# Patient Record
Sex: MALE | URBAN - METROPOLITAN AREA
[De-identification: names, ages, dates, MRNs, and addresses within clinical notes are randomized per-mention and may not be internally consistent; named-entity substitution may affect disease eponyms.]

---
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
## 2021-04-05 ENCOUNTER — TRANSFERRED RECORDS (OUTPATIENT)
Dept: HEALTH INFORMATION MANAGEMENT | Facility: CLINIC | Age: 21
End: 2021-04-05

## 2021-10-14 ENCOUNTER — TRANSFERRED RECORDS (OUTPATIENT)
Dept: HEALTH INFORMATION MANAGEMENT | Facility: CLINIC | Age: 21
End: 2021-10-14

## 2021-11-18 ENCOUNTER — TRANSFERRED RECORDS (OUTPATIENT)
Dept: HEALTH INFORMATION MANAGEMENT | Facility: CLINIC | Age: 21
End: 2021-11-18

## 2021-11-19 ENCOUNTER — TRANSFERRED RECORDS (OUTPATIENT)
Dept: HEALTH INFORMATION MANAGEMENT | Facility: CLINIC | Age: 21
End: 2021-11-19

## 2022-03-08 ENCOUNTER — TRANSFERRED RECORDS (OUTPATIENT)
Dept: HEALTH INFORMATION MANAGEMENT | Facility: CLINIC | Age: 22
End: 2022-03-08

## 2022-04-28 ENCOUNTER — TRANSFERRED RECORDS (OUTPATIENT)
Dept: HEALTH INFORMATION MANAGEMENT | Facility: CLINIC | Age: 22
End: 2022-04-28

## 2022-07-10 ENCOUNTER — TRANSFERRED RECORDS (OUTPATIENT)
Dept: HEALTH INFORMATION MANAGEMENT | Facility: CLINIC | Age: 22
End: 2022-07-10

## 2022-07-11 ENCOUNTER — TRANSFERRED RECORDS (OUTPATIENT)
Dept: HEALTH INFORMATION MANAGEMENT | Facility: CLINIC | Age: 22
End: 2022-07-11

## 2022-07-13 ENCOUNTER — TRANSFERRED RECORDS (OUTPATIENT)
Dept: HEALTH INFORMATION MANAGEMENT | Facility: CLINIC | Age: 22
End: 2022-07-13

## 2023-03-28 ENCOUNTER — TRANSFERRED RECORDS (OUTPATIENT)
Dept: HEALTH INFORMATION MANAGEMENT | Facility: CLINIC | Age: 23
End: 2023-03-28

## 2023-03-28 ENCOUNTER — LAB REQUISITION (OUTPATIENT)
Dept: LAB | Facility: CLINIC | Age: 23
End: 2023-03-28

## 2023-03-28 PROCEDURE — 83150 ASSAY OF HOMOVANILLIC ACID: CPT

## 2023-03-29 LAB
HVA/CREAT UR-SRTO: 4.5 MG/G CR (ref 0–8.9)
VMA/CREAT UR: 3.9 MG/G CR (ref 0–8.1)

## 2023-04-24 ENCOUNTER — TRANSFERRED RECORDS (OUTPATIENT)
Dept: HEALTH INFORMATION MANAGEMENT | Facility: CLINIC | Age: 23
End: 2023-04-24

## 2023-06-27 ENCOUNTER — TRANSFERRED RECORDS (OUTPATIENT)
Dept: HEALTH INFORMATION MANAGEMENT | Facility: CLINIC | Age: 23
End: 2023-06-27

## 2023-08-07 ENCOUNTER — TRANSFERRED RECORDS (OUTPATIENT)
Dept: HEALTH INFORMATION MANAGEMENT | Facility: CLINIC | Age: 23
End: 2023-08-07

## 2023-08-08 ENCOUNTER — TRANSFERRED RECORDS (OUTPATIENT)
Dept: HEALTH INFORMATION MANAGEMENT | Facility: CLINIC | Age: 23
End: 2023-08-08

## 2023-08-09 ENCOUNTER — TRANSFERRED RECORDS (OUTPATIENT)
Dept: HEALTH INFORMATION MANAGEMENT | Facility: CLINIC | Age: 23
End: 2023-08-09

## 2023-08-10 ENCOUNTER — TRANSFERRED RECORDS (OUTPATIENT)
Dept: HEALTH INFORMATION MANAGEMENT | Facility: CLINIC | Age: 23
End: 2023-08-10

## 2023-08-11 ENCOUNTER — TRANSFERRED RECORDS (OUTPATIENT)
Dept: HEALTH INFORMATION MANAGEMENT | Facility: CLINIC | Age: 23
End: 2023-08-11

## 2023-12-15 ENCOUNTER — TRANSFERRED RECORDS (OUTPATIENT)
Dept: HEALTH INFORMATION MANAGEMENT | Facility: CLINIC | Age: 23
End: 2023-12-15

## 2023-12-16 ENCOUNTER — TRANSFERRED RECORDS (OUTPATIENT)
Dept: HEALTH INFORMATION MANAGEMENT | Facility: CLINIC | Age: 23
End: 2023-12-16

## 2024-04-04 ENCOUNTER — MEDICAL CORRESPONDENCE (OUTPATIENT)
Dept: HEALTH INFORMATION MANAGEMENT | Facility: CLINIC | Age: 24
End: 2024-04-04
Payer: COMMERCIAL

## 2024-04-05 ENCOUNTER — TRANSCRIBE ORDERS (OUTPATIENT)
Dept: OTHER | Age: 24
End: 2024-04-05

## 2024-04-05 DIAGNOSIS — E31.23: Primary | ICD-10-CM

## 2024-04-08 ENCOUNTER — TELEPHONE (OUTPATIENT)
Dept: ENDOCRINOLOGY | Facility: CLINIC | Age: 24
End: 2024-04-08
Payer: COMMERCIAL

## 2024-04-10 ENCOUNTER — TELEPHONE (OUTPATIENT)
Dept: ENDOCRINOLOGY | Facility: CLINIC | Age: 24
End: 2024-04-10
Payer: COMMERCIAL

## 2024-04-10 ENCOUNTER — TELEPHONE (OUTPATIENT)
Dept: ENDOCRINOLOGY | Facility: CLINIC | Age: 24
End: 2024-04-10

## 2024-04-10 NOTE — TELEPHONE ENCOUNTER
Left Voicemail (1st Attempt) for the patient to call back and schedule the following:    Appointment type: new endocrine   Provider: Dr. Lara   Return date:  4/10 in person at 3pm or first available   Specialty phone number: [phone]  Additional appointment(s) needed:   Additonal Notes:     Jorge Parks on 4/10/2024 at 10:37 AM

## 2024-04-11 ENCOUNTER — TRANSFERRED RECORDS (OUTPATIENT)
Dept: HEALTH INFORMATION MANAGEMENT | Facility: CLINIC | Age: 24
End: 2024-04-11
Payer: COMMERCIAL

## 2024-04-12 NOTE — TELEPHONE ENCOUNTER
DX, Referring NOTES: Multiple endocrine neoplasia type 2B     For Cancer Patients: Need the original operative and surgical pathology reports and all imaging reports/images related to the disease (includes all thyroid US, nuclear thyroid and total body scans, PET scans, chest CT reports since prior to the diagnosis ).   APPT DATE: 4/29/2024   NOTES (FOR ALL VISITS) STATUS DETAILS   OFFICE NOTES from referring provider [facility]:  3/28/23 - ENDO OV with Dr. Mejia   OFFICE NOTES from other specialist Received [facility]:  8/22/23 - END OOV with Dr. Alvarez  6/27/23 - SURG ONC OV with Dr. Fernandez  3/29/22 - SURG ONC OV with Dr. Plaza  3/8/22 - ENDO OV with Dr. Reynolds  10/3/19 - ENDO OV with Dr. Foster    [facility]:  7/25/17 - ENT OV with Dr. Corado  7/20/17 - HEM ONC OV with Dr. Correa   ED NOTES Received [facility]:  7/10/22 - Admission with Dr. Oconnor   OPERATIVE REPORT  (thyroid, pituitary, adrenal, parathyroid)  (All op notes related to diagnoses) Received [facility]:  7/11/22 - OP Note for PARTIAL LEFT ADRENALECTOMY with Dr. Meredith    [facility]:  7/7/17 - OP Note for RADICAL TOTAL THYROIDECTOMY, CENTRA NECK DISSECTION, REMOVAL OF MEDIASTINAL LYMPH NODES, PARTIAL THYMUS REMOVAL, PARATHYROID AUTOTRANSPLANTATION with Dr. Corado   MEDICATION LIST Received    IMAGING      MRI (BRAIN) Received [facility]:  10/24/17 - MRI Orbit/Face   XR (Chest) Received [facility]:  11/19/21 - XR Video Swallow   CT (HEAD/NECK/CHEST/ABDOMEN) Received [facility]:  8/10/23 - PET/CT  8/9/23 - PET/CT  8/9/23 - CT Chest/Abd/Pelvis  8/9/23 - CT Neck  8/8/23 - PET/CT  3/9/22 - PET/CT  3/8/22 = PET/CT  3/7/22 - PET/CT    [facility]:  7/19/17 - PET/CT - In PACs   NUCLEAR Received [facility]:  8/11/23 - NM PET    [facility]:  6/29/17 - NM PET/CT   ULTRASOUND (HEAD/NECK)  * Include FNAs Received [facility]:  11/18/21 - US Neck  3/4/21 - US Thyroid  9/30/19 - US Neck  10/16/18 - US Neck  2/13/18 - US Neck    [facility]:  10/24/17 - US Head/Neck  6/28/17 - US Head/Neck  6/28/17 - US  Thyroid  17 - US Thyroid   LABS     DIABETES: HBGA1C, CREATININE, FASTING LIPIDS, MICROALBUMIN URINE, POTASSIUM, TSH, T4    THYROID: TSH, T4, CBC, THYRODLONULIN, TOTAL T3, FREE T4, CALCITONIN, CEA [facility] [place]:  3/28/23 - Albumin  3/28/23 - Creatinine  3/28/23 - Glucose  3/28/23 - Thyroglobulin  3/28/23 - TSH,T4  22 - HBGA1C  10/14/21 - CEA   PATHOLOGY REPORTS WITH CASE NUMBER  *Surgical path reports for endocrine organs (ovaries, testes, pancreas, etc) Received   [facility]:  17 - Thyroid (Case: [id number])     Records Requested  24    Facility  [facility]  Fax: [phone]  E-mail: [email or web address]  Fax: [phone]  Phone: [phone]   Outcome * 24 1:55 PM Faxed urgent request to [facility] for records to be faxed to the clinic. - Loyda    * 24 11:19 AM Records received from [facility] and sent to HIM to be scanned into the chart. Faxed urgent request to [facility] for records to be faxed to the clinic. - Loyda    * 24 8 AM E-mailed another urgent request to the patient with DANDRE form attached to be able to get the records from [facility]. - Loyda    * 24 12:05 PM DANDRE received from the patient and urgent request sent to [facility] for images and records to be sent to the clinic. - Loyda    * 24 10:10 AM Imaging disc received from [facility] and sent to [facility] to be uploaded into PACs. - Loyda    * 24 10:29 AM Record and imaging disc received from [facility] and sent to be scanned into the chart and uploaded into PACs.  Images received from [facility] and attached to the patient in PACs.    * 24 11:15 AM Records received from [facility] and sent to HIM to be scanned into the chart. - Loyda    Trackin

## 2024-04-12 NOTE — TELEPHONE ENCOUNTER
Left Voicemail (2nd Attempt) for the patient to call back and schedule the following:    Appointment type: new endocrine   Provider: Dr. Lara or Alejandrina   Return date: Next avail New or New XIOMARA within 1 month  Specialty phone number: [phone]  Additional appointment(s) needed: NA   Additonal Notes: LVM x2, No MyC & letter sent x1    Denise Lara MD  [facility]; Denise Lara MD  To schedulers : please schedule as new thyroid cancer with either Dr Lara or Dr Tinoco within a month. This could be a virtual visit.     *Dr Lara does not have New XIOMARA slots, it would be next avail virtual or in person. There is availability within the month in solutions. Thank you.*    Barbara Dan on 4/12/2024 at 8:58 AM

## 2024-04-23 ENCOUNTER — TRANSFERRED RECORDS (OUTPATIENT)
Dept: HEALTH INFORMATION MANAGEMENT | Facility: CLINIC | Age: 24
End: 2024-04-23

## 2024-04-29 ENCOUNTER — VIRTUAL VISIT (OUTPATIENT)
Dept: ENDOCRINOLOGY | Facility: CLINIC | Age: 24
End: 2024-04-29
Attending: PEDIATRICS
Payer: COMMERCIAL

## 2024-04-29 ENCOUNTER — TRANSFERRED RECORDS (OUTPATIENT)
Dept: HEALTH INFORMATION MANAGEMENT | Facility: CLINIC | Age: 24
End: 2024-04-29

## 2024-04-29 ENCOUNTER — PRE VISIT (OUTPATIENT)
Dept: ENDOCRINOLOGY | Facility: CLINIC | Age: 24
End: 2024-04-29

## 2024-04-29 DIAGNOSIS — E89.0 POSTSURGICAL HYPOTHYROIDISM: ICD-10-CM

## 2024-04-29 DIAGNOSIS — E83.51 HYPOCALCEMIA: ICD-10-CM

## 2024-04-29 DIAGNOSIS — E31.23: ICD-10-CM

## 2024-04-29 DIAGNOSIS — Z86.018 HISTORY OF PHEOCHROMOCYTOMA: ICD-10-CM

## 2024-04-29 DIAGNOSIS — E89.6 HISTORY OF PARTIAL ADRENALECTOMY (H): ICD-10-CM

## 2024-04-29 DIAGNOSIS — C73 MEDULLARY THYROID CARCINOMA (H): Primary | ICD-10-CM

## 2024-04-29 PROCEDURE — 99205 OFFICE O/P NEW HI 60 MIN: CPT | Mod: 95

## 2024-04-29 PROCEDURE — 99417 PROLNG OP E/M EACH 15 MIN: CPT | Mod: 95

## 2024-04-29 RX ORDER — AMLODIPINE BESYLATE 10 MG/1
10 TABLET ORAL DAILY
COMMUNITY
Start: 2024-04-29

## 2024-04-29 RX ORDER — LEVOTHYROXINE SODIUM 125 UG/1
125 TABLET ORAL DAILY
COMMUNITY
Start: 2024-04-29 | End: 2024-09-16 | Stop reason: DRUGHIGH

## 2024-04-29 ASSESSMENT — PAIN SCALES - GENERAL: PAINLEVEL: NO PAIN (0)

## 2024-04-29 NOTE — PROGRESS NOTES
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  Treat to normal target TSH. Unstable.  Labs following appt show he is biochemically hypothyroid .    Addendum  5/15/24 TSh 46.95, free T4 1.29, Ca 8,2 PTH 22, phos 3.7, CEA 8.4, calcitonin 70.6, metanephrine 0.42 nM, normetanephrine 0.47 nM-- admitted missed doses LT4   9/13/24 TSH 20.29, free T4 1.67, Ca 8.6, phos 3.4,   5/15/24 neck US compared with 11/18/21 [facility]   Right level 4 # 1 0.7 x 0.3 x 0.8 cm   Right level 5 high 1 1.1 x 0.7 x 1.7 cm   Right level 5 lower # 3 1 x 0.6 x 1.3 cm   Left level 4 # 1 0.4 x 0.4 x 0.6   Left level 5 high # 1 1.4 x 0.4 x 1.2   Left level 6# 1 0.7 x 0.5 x 0.8 abnormal  was  ?  0.4 x 0.6   Left level 7 # 1 0.7 x 0.4 x  0.8  cm     Medullary thyroid carcinoma bilateral, multifocal, grossly invading ETE; + 43/76 LN with LYNNETTE  I don't have the original [facility] path report, only the re-read from [facility]  pT4a, pN1b, cM0, stage BETHANY  Labs : calcitonin, cEA   Neck US at [facility]     History of pheochromocytoma and partial adrenal surgery. .   HTN is being treated with amlodipine  He is at risk for pheo in remaining adrenal   Labs: metanephrine, renin, Na, K     History of partial left adrenalectomy -     Due to the COVID 19 pandemic this visit was a video visit in order to help prevent spread of infection in this patient and the general population. The patient gave verbal consent for the visit today. I have independently reviewed and interpreted labs, imaging as indicated.     Distant Location (provider location):  Off-site  Mode of Communication:  Video Conference via [employer]  Chart review/prep time 1    Visit Start time  1703   Visit Stop time 1736   _180 _ minutes spent on the date of the encounter doing chart  review, history and exam, documentation and further activities as noted above.      Denise Lara MD    Chief complaint:  Trever is a 23 year old male seen in consultation at the request of Dr Bi Mejia ([facility] endocrinology )  for MEN  I have reviewed Care Everywhere including [facility], [facility], lab reports, imaging reports and provider notes as indicated. Care everywhere is not open for [facility] or [facility].   I have also reviewed [facility] records scanned to the media tab, most of which were uploaded AFTER I have reviewed the record to prepare for this appt, so most of the review had to follow the appt     HISTORY OF PRESENT ILLNESS    Trever recalls being seen by periodontist for gum hyperplasia who also noted Mucosal neuromas leading to  ret testing 6/27/27 RET M918T ( I do not have the primary report)  He was soon discovered to have medullary thyroid carcinoma .  Preoperative Calcitonin i 1942 7/7/2017 total thyroidectomy , CND, bilateral level 2-4 neck dissection; right RLN anastomosis ([facility]) -  I do not have operative report MTC MF bilateral, + 42/77 LN with LYNNETTE  . The path report doesn't have primary tumor size measurement-- other notes describe ETE, grossly invading strap muscles, esophageal muscles, cricopharyngeus muscle, plastered and stuck to the tracheal cartilage and invading, encasing the RLN.  Some of the LNs were stuck to the internal jugular, deep spaces of the neck RLN and sympathetic chain.  soft tissues, + margins, + extensive angiolymphatic invasion, + perineural invasion  2 parathyroid glands resected  Post op low calcium and hypertension  7/3/17 MRI nodular thickening left adrenal   7/11/22 laparoscopic partial left adrenalectomy with intraoperative US - ([facility]) --  3 tumor foci removed-- Path: left  pheochromocytoma  x 2  -- I don't have primary path report that fully describes the tumor      Endocrine relevant labs are as follows:  10/16/18  calcitonin 73, CEA 5.3  9/30/19 Calcitonin 96, CEA 5.6  8/5/2020 calcitonin 123, CEA 7.2, metanephrine 0.69 nM, normetanephrine 0.8  3/10/21 calcitonin 99, CEA 7.2, metanephrine 0.5 nM, normetanephrine 0.4  10/14/21 calcitonin 144, CEA 7.9 ng/ml, metanephrine 0.81 nM, normetanephrine 0.63 nM, TSH 0.25, free T4 1.24, PTH 11.4, Ca 9.6, YAIR < 1.8, Tg < 0.1  11/21/21 height 182 cm, weight 50.4 kg, BMI 15.2 kg/m2  3/28/23 calcitonin 130, renin activity 17, metanephrine 0.65 nM, normetanephrine 0.46, TSH 0.04, free T4 1.5, Tg < 0.1, YAIR < 1.8, PTH 12,2 Ca 9.9; .94 mg/g creatinine  8/7/23 [facility]   pg/ml, Epi  <25 pg/ml, DA < 25 pg/ml, cortisol 14.4, CGA 45 (< 0.93 ng/ml), metanephrine 0.47, normetanephrine  0.41    Relevant imaging is as follows: (as read by me as it pertains to endocrine relevant organs)  Selected studieses described   6/22/17 and 6/28/17 thyroid US ([facility]):   Right nodule 1.4 x ? X 2,2   Right sup posterior 0.9  X 0.7 x 1.2 cm   Left whole lobe appears involved  2  X   1.8 x 2.8 / abnormal; shadowing calcificaiton   Left isthmus 1.5 x   7/19/17 FDG PET CT [facility] -on pacs- negative to my eye; thyroid absent  4/28/22 US - not on pacs   4/24/23 MRI  not on pas   12/15/23 CT abdomen - not on pacs: left adrenal atrophic ; subtle thickening right adrenal medial limb (series 2, image 25)  8/8/23 Xray abdomen:   8/8/23 ([facility]) F-18-F-DOPA PET ([facility]) persistent small uptake right upper paratracheal with no progressoin since prior study  8/9/23 F18 F Doamine PET ([facility]) negative  8/9/23 CT  neck , CAP ([facility]): RLL lung nodule 0.2 cm, stable; LLL subpleural nodule 0.2 cm stable; stable left adrenal nodule ; subcm paratracheal nodules lower neck   8/10/23 dotatate PET CT ([facility]) no evidence of SSTR2 positive lesions, including at the left adrenal  8/11/2023 FDG  PET CT  ([facility]) -     He plans to return to [facility] -- he doesn't know when he goes back there.  He needs to send them the 12/23 MRI -   He is in a [facility]  MEN2B study - they do a lot of imaging, other tests.     REVIEW OF SYSTEMS  Weight stable;   Was skinny in -120-125#; now gained to 150s ; height 6'0  Cardiac: BP has been high ; amlodipine helped  respiratory  GI: no diarrhea; negative; has megacolon   No paresthesias   No cramps  Marfanoid body habitus  10 system ROS otherwise as per the HPI or negative    Past Medical History  Past Medical History:   Diagnosis Date    ADHD (attention deficit hyperactivity disorder)     Duane's syndrome, right eye     Hepatic hemangioma     HTN (hypertension)     Hypocalcemia     Lyme disease 2012    Medullary thyroid carcinoma (H) 07/07/2017    Megacolon     Multiple endocrine neoplasia type 2B (MEN2B) (H) 06/27/2017    sporadic mutation RET M918T    Myopathy 2010    Pes cavus     Pheochromocytoma of left adrenal gland 07/11/2022    2 foci    Postsurgical hypothyroidism 07/07/2017    Proximal humerus fracture 04/02/2016    fall from standing height    Renal artery anomaly     trifurcation right; bifurcation left    Scoliosis        Past Surgical History:   Procedure Laterality Date    BIOPSY      HERNIA REPAIR Right 2002    laparoscopic partial adrenalectomy Left 07/11/2022    ORCHIOPEXY Right 2002    ORTHOPEDIC SURGERY      subtalar joint and talonavicular joint fusion with multiple tendon transfers, MPJ capsulotomy, PIPJ capsulotomy, Steindler stripping, and gastrocnemius lengthening by FAS  06/05/2018    THYROIDECTOMY   07/07/2017       Medications  Current Outpatient Medications   Medication Sig Dispense Refill    amLODIPine (NORVASC) 10 MG tablet Take 1 tablet (10 mg) by mouth daily      levothyroxine (SYNTHROID/LEVOTHROID) 125 MCG tablet Take 1 tablet (125 mcg) by mouth daily       Levothryoxine 125 mcg/day x years  Calcium 2-3 pills at the end of the night - (each pill 600)  Amlodipine 10 mg/fday    No longer on calcitriol    Allergies  No Known Allergies    Family History  Family History   Problem Relation Age of  "Onset    Hyperlipidemia Maternal Grandmother     Heart Disease Maternal Grandfather     Multiple Endocrine Neoplasia Type 2 No family hx of     Nephrolithiasis No family hx of     Adrenal Disorder No family hx of     Thyroid Cancer No family hx of      Social History  Social History     Tobacco Use    Smoking status: Never     Lives in [place]; student at 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  BP Readings from Last 1 Encounters:   04/02/16 (!) 134/98 (97%, Z = 1.88 /  >99 %, Z >2.33)*     *BP percentiles are based on the 2017 AAP Clinical Practice Guideline for boys      Pulse Readings from Last 1 Encounters:   No data found for Pulse      Resp Readings from Last 1 Encounters:   04/02/16 18      Temp Readings from Last 1 Encounters:   04/02/16 97.6  F (36.4  C)      SpO2 Readings from Last 1 Encounters:   04/02/16 98%      Wt Readings from Last 1 Encounters:   04/02/16 40.6 kg (89 lb 8 oz) (3%, Z= -1.90)*     * Growth percentiles are based on CDC (Boys, 2-20 Years) data.      Ht Readings from Last 1 Encounters:   04/02/16 1.651 m (5' 5\") (30%, Z= -0.52)*     * Growth percentiles are based on CDC (Boys, 2-20 Years) data.     GENERAL: pleasant young man in no distress; full lips; I can see from upper chest up   SKIN: Visible skin clear. No significant rash, abnormal pigmentation or lesions.  EYES: eyes don't perfectly track when looking up ;   No discharge or erythema, or obvious scleral/conjunctival abnormalities.  NECK: visible goiter is not present; no visible neck masses  RESP: No audible wheeze, cough, or  increased work of breathing.    NEURO: Awake, alert, responds appropriately to questions.  Mentation and speech fluent.  PSYCH:affect normal and appearance well-groomed.    DATA     EXAMINATION: US HEAD NECK SOFT TISSUE, 5/15/2024 11:30 AM   COMPARISON: PET/CT 8/11/2023  HISTORY: Medullary thyroid carcinoma " (H); Postsurgical hypothyroidism;  Hypocalcemia; MEN 2B syndrome (H)  TECHNIQUE: Sonographic imaging performed of the neck to evaluate for lymph nodes using grey scale and limited Doppler technique.  FINDINGS:  Lymph nodes are measured bilaterally with measurements given in transverse, AP, and craniocaudal dimensions as follows:  Right:  Level 2: Ovoid lymph node with diminutive fatty hilum and no internal vascularity on color Doppler measuring 0.9 x 0.7 x 1 cm, possibly benign.  Level 3: No suspicious or enlarged lymph node  Level 4: Ovoid lymph node with fatty hilum and no internal vascularity on Doppler measuring 0.7 x 0.3 x 0.8 cm, possibly benign  Level 5:   Lymph node #1: Oval-shaped lymph node with fatty hilum and no internal vascularity on color Doppler measuring 1.1 x 0.7 x 1.7 cm, probably benign.  Lymph node#3: Ovoid lymph node with fatty hilum and mild hilar vascularity on color Doppler measuring 1 x 0.6 x 1.4 cm, possibly benign   Level 6: No suspicious or enlarged lymph node  Level 7: No suspicious or enlarged lymph node   Left:  Level 2: No suspicious or enlarged lymph node  Level 3: No suspicious or enlarged lymph node  Level 4: Ovoid lymph node measuring 0.4 x 0.4 x 0.6 cm with indistinct  fatty hilum and no internal vascularity, indeterminate/possibly benign.  Level 5: Oval-shaped lymph node with fatty hilum and hilar vascularity on color Doppler measuring 1.4 x 0.4 x 1.2 cm, possibly benign  Level 6 thyroid bed: Mildly lobulated hypoechoic echogenicity with indistinct fatty hilum and internal vascularity measuring 0.7 x 0.5 x 0.8 cm, indeterminate  Level7: Oval-shaped lymph node with no internal vascularity on Doppler and questionable echogenic foci, indeterminate, measuring 0.7 x 0.4 x 0.8 cm.                                                         IMPRESSION:  Soft tissue neck ultrasound with lymph node measurements as described above, including indeterminate left level 6 hypoechogenicity.  I  have personally reviewed the examination and initial interpretation and I agree with the findings.  BREE LACEY MD      Latest Reference Range & Units 05/15/24 11:39   Sodium 135 - 145 mmol/L 139   Potassium 3.4 - 5.3 mmol/L 3.4   Calcium 8.6 - 10.0 mg/dL 8.2 (L)   Phosphorus 2.5 - 4.5 mg/dL 3.7   CEA ng/mL 8.4   METANEPHRINES PLASMA FREE  Rpt (IP)   T4 Free 0.90 - 1.70 ng/dL 1.29   TSH 0.30 - 4.20 uIU/mL 46.95 (H)   Parathyroid Hormone Intact 15 - 65 pg/mL 22

## 2024-04-29 NOTE — PATIENT INSTRUCTIONS
Neck Ultrasound  at the [facility] on [facility] ([phone] to schedule)   Labs in the [employer] system   I will send results on [employer].

## 2024-04-29 NOTE — NURSING NOTE
Is the patient currently in the state of [place]? YES    Visit mode:VIDEO    If the visit is dropped, the patient can be reconnected by: VIDEO VISIT: Text to cell phone:   Telephone Information:   Mobile [phone]       Will anyone else be joining the visit? NO  (If patient encounters technical issues they should call [phone]    How would you like to obtain your AVS? MyChart    Are changes needed to the allergy or medication list? Yes Pt taking Calcium supplement, also Levothyroxine .125mcg morning; amplodipne 10 mg daily;     Are refills needed on medications prescribed by this physician? NO    Reason for visit: Consult    Rosy GARCIAF

## 2024-05-08 ENCOUNTER — TELEPHONE (OUTPATIENT)
Dept: ENDOCRINOLOGY | Facility: CLINIC | Age: 24
End: 2024-05-08
Payer: COMMERCIAL

## 2024-05-08 NOTE — TELEPHONE ENCOUNTER
Patient confirmed scheduled appointment:  Date: 10/28/24  Time: 4 pm  Visit type: return thyroid cancer  Provider: Dr. Lara  Location: virtual  Testing/imaging: labs and US on 5/15/24  Additional notes: NA

## 2024-05-15 ENCOUNTER — ANCILLARY PROCEDURE (OUTPATIENT)
Dept: ULTRASOUND IMAGING | Facility: CLINIC | Age: 24
End: 2024-05-15
Payer: COMMERCIAL

## 2024-05-15 ENCOUNTER — LAB (OUTPATIENT)
Dept: LAB | Facility: CLINIC | Age: 24
End: 2024-05-15
Payer: COMMERCIAL

## 2024-05-15 DIAGNOSIS — E89.0 POSTSURGICAL HYPOTHYROIDISM: ICD-10-CM

## 2024-05-15 DIAGNOSIS — C73 MEDULLARY THYROID CARCINOMA (H): ICD-10-CM

## 2024-05-15 DIAGNOSIS — E31.23: ICD-10-CM

## 2024-05-15 DIAGNOSIS — Z86.018 HISTORY OF PHEOCHROMOCYTOMA: ICD-10-CM

## 2024-05-15 DIAGNOSIS — E89.6 HISTORY OF PARTIAL ADRENALECTOMY (H): ICD-10-CM

## 2024-05-15 DIAGNOSIS — E83.51 HYPOCALCEMIA: ICD-10-CM

## 2024-05-15 LAB
CALCIUM SERPL-MCNC: 8.2 MG/DL (ref 8.6–10)
CEA SERPL-MCNC: 8.4 NG/ML
PHOSPHATE SERPL-MCNC: 3.7 MG/DL (ref 2.5–4.5)
POTASSIUM SERPL-SCNC: 3.4 MMOL/L (ref 3.4–5.3)
PTH-INTACT SERPL-MCNC: 22 PG/ML (ref 15–65)
SODIUM SERPL-SCNC: 139 MMOL/L (ref 135–145)
T4 FREE SERPL-MCNC: 1.29 NG/DL (ref 0.9–1.7)
TSH SERPL DL<=0.005 MIU/L-ACNC: 46.95 UIU/ML (ref 0.3–4.2)

## 2024-05-15 PROCEDURE — 82308 ASSAY OF CALCITONIN: CPT | Mod: 90 | Performed by: PATHOLOGY

## 2024-05-15 PROCEDURE — 84244 ASSAY OF RENIN: CPT | Mod: 90 | Performed by: PATHOLOGY

## 2024-05-15 PROCEDURE — 36415 COLL VENOUS BLD VENIPUNCTURE: CPT | Performed by: PATHOLOGY

## 2024-05-15 PROCEDURE — 84439 ASSAY OF FREE THYROXINE: CPT | Performed by: PATHOLOGY

## 2024-05-15 PROCEDURE — 84443 ASSAY THYROID STIM HORMONE: CPT | Performed by: PATHOLOGY

## 2024-05-15 PROCEDURE — 84132 ASSAY OF SERUM POTASSIUM: CPT | Performed by: PATHOLOGY

## 2024-05-15 PROCEDURE — 83835 ASSAY OF METANEPHRINES: CPT | Mod: 90 | Performed by: PATHOLOGY

## 2024-05-15 PROCEDURE — 84100 ASSAY OF PHOSPHORUS: CPT | Performed by: PATHOLOGY

## 2024-05-15 PROCEDURE — 76536 US EXAM OF HEAD AND NECK: CPT | Mod: GC | Performed by: STUDENT IN AN ORGANIZED HEALTH CARE EDUCATION/TRAINING PROGRAM

## 2024-05-15 PROCEDURE — 84295 ASSAY OF SERUM SODIUM: CPT | Performed by: PATHOLOGY

## 2024-05-15 PROCEDURE — 83970 ASSAY OF PARATHORMONE: CPT | Performed by: PATHOLOGY

## 2024-05-15 PROCEDURE — 86316 IMMUNOASSAY TUMOR OTHER: CPT | Mod: 90 | Performed by: PATHOLOGY

## 2024-05-15 PROCEDURE — 99000 SPECIMEN HANDLING OFFICE-LAB: CPT | Performed by: PATHOLOGY

## 2024-05-15 PROCEDURE — 82378 CARCINOEMBRYONIC ANTIGEN: CPT

## 2024-05-15 PROCEDURE — 82310 ASSAY OF CALCIUM: CPT | Performed by: PATHOLOGY

## 2024-05-17 LAB
CALCIT SERPL-MCNC: 70.6 PG/ML
CGA SERPL-MCNC: 42 NG/ML

## 2024-05-18 LAB
ANNOTATION COMMENT IMP: NORMAL
METANEPHS SERPL-SCNC: 0.42 NMOL/L
NORMETANEPHRINE SERPL-SCNC: 0.47 NMOL/L
RENIN PLAS-CCNC: 22 NG/ML/HR

## 2024-09-08 ENCOUNTER — HEALTH MAINTENANCE LETTER (OUTPATIENT)
Age: 24
End: 2024-09-08

## 2024-09-13 ENCOUNTER — LAB (OUTPATIENT)
Dept: LAB | Facility: CLINIC | Age: 24
End: 2024-09-13
Payer: COMMERCIAL

## 2024-09-13 DIAGNOSIS — E89.0 POSTSURGICAL HYPOTHYROIDISM: ICD-10-CM

## 2024-09-13 DIAGNOSIS — E83.51 HYPOCALCEMIA: ICD-10-CM

## 2024-09-13 DIAGNOSIS — C73 MEDULLARY THYROID CARCINOMA (H): ICD-10-CM

## 2024-09-13 LAB
CALCIUM SERPL-MCNC: 8.6 MG/DL (ref 8.8–10.4)
CEA SERPL-MCNC: 8.6 NG/ML
PHOSPHATE SERPL-MCNC: 3.4 MG/DL (ref 2.5–4.5)
T4 FREE SERPL-MCNC: 1.67 NG/DL (ref 0.9–1.7)
TSH SERPL DL<=0.005 MIU/L-ACNC: 20.29 UIU/ML (ref 0.3–4.2)

## 2024-09-13 PROCEDURE — 82310 ASSAY OF CALCIUM: CPT

## 2024-09-13 PROCEDURE — 84439 ASSAY OF FREE THYROXINE: CPT

## 2024-09-13 PROCEDURE — 84100 ASSAY OF PHOSPHORUS: CPT

## 2024-09-13 PROCEDURE — 84443 ASSAY THYROID STIM HORMONE: CPT

## 2024-09-13 PROCEDURE — 82308 ASSAY OF CALCITONIN: CPT

## 2024-09-13 PROCEDURE — 36415 COLL VENOUS BLD VENIPUNCTURE: CPT

## 2024-09-13 PROCEDURE — 82378 CARCINOEMBRYONIC ANTIGEN: CPT

## 2024-09-15 LAB — CALCIT SERPL-MCNC: 106 PG/ML

## 2024-09-16 DIAGNOSIS — E89.0 POSTSURGICAL HYPOTHYROIDISM: ICD-10-CM

## 2024-09-16 DIAGNOSIS — C73 MEDULLARY THYROID CARCINOMA (H): Primary | ICD-10-CM

## 2024-09-16 RX ORDER — LEVOTHYROXINE SODIUM 150 UG/1
150 TABLET ORAL DAILY
Qty: 90 TABLET | Refills: 4 | Status: SHIPPED | OUTPATIENT
Start: 2024-09-16

## 2024-10-04 NOTE — PROGRESS NOTES
10/04/24 11:34 AM  PATIENT LAB/IMAGING STATUS : MyChart sent to patient to complete standing/future orders

## 2024-10-28 ENCOUNTER — VIRTUAL VISIT (OUTPATIENT)
Dept: ENDOCRINOLOGY | Facility: CLINIC | Age: 24
End: 2024-10-28
Payer: COMMERCIAL

## 2024-10-28 DIAGNOSIS — E89.0 POSTSURGICAL HYPOTHYROIDISM: ICD-10-CM

## 2024-10-28 DIAGNOSIS — E31.23: Primary | ICD-10-CM

## 2024-10-28 DIAGNOSIS — E89.6 HISTORY OF PARTIAL ADRENALECTOMY (H): ICD-10-CM

## 2024-10-28 DIAGNOSIS — C73 MEDULLARY THYROID CARCINOMA (H): ICD-10-CM

## 2024-10-28 DIAGNOSIS — Z86.018 HISTORY OF PHEOCHROMOCYTOMA: ICD-10-CM

## 2024-10-28 DIAGNOSIS — R22.1 NECK MASS: ICD-10-CM

## 2024-10-28 PROCEDURE — 99214 OFFICE O/P EST MOD 30 MIN: CPT | Mod: 95

## 2024-10-28 PROCEDURE — G2211 COMPLEX E/M VISIT ADD ON: HCPCS | Mod: 95

## 2024-10-28 RX ORDER — LISINOPRIL 10 MG/1
10 TABLET ORAL DAILY
COMMUNITY
Start: 2024-10-01

## 2024-10-28 NOTE — LETTER
10/28/2024       RE: Trever Carter  [address]  [facility] [id number]     Dear Colleague,    Thank you for referring your patient, Trever Carter, to the [facility] ENDOCRIN[facility] [place] at [facility]. Please see a copy of my visit note below.    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  Treat to normal target TSH. Not at target  Unstable.  Compliance has been an issue.    Labs in November 2024 with dose adjustment    Medullary thyroid carcinoma bilateral, multifocal, grossly invading ETE; + 43/76 LN with LYNNETTE  I don't have the original [facility] path report, only the re-read from [facility]  pT4a, pN1b, cM0, stage BETHANY    Left level 6 mass  as above  Repeat US now-     History of pheochromocytoma and partial adrenal surgery. .   HTN is being treated with lisinopril   He is at risk for pheo in remaining adrenal     History of partial left adrenalectomy -      The Longitudinal plan of care for postsurgical hypothyroidism related to MEN2b/ thyroid cancer/thyroid cancer surveillance/treatment/ was addressed during this visit. Due to added complexity of care, we will continue to support Trever , and the subsequent management of this condition(s) and with the ongoing continuity of care of this condition.    Due to the continued risks of COVID 19 transmission and to improve ease of access this visit was a telephone/video visit. The patient gave verbal consent for the visit today.    I have independently reviewed and interpreted labs, imaging as indicated.     Distant Location (provider location):  Off-site  Mode of  Communication:  Video Conference via [employer]  Chart review/prep time 1  [id number]  Visit Start time  1549   Visit Stop time  1603   39__ minutes spent on the date of the encounter doing chart review, history and exam, documentation and further activities as noted above.      Denise Lara MD    Chief complaint:  HISTORY OF PRESENT ILLNESS    Trever presents for RET M918T MEN 2B, sporadic by history with associated MTC, pheochromocytoma, mucosal neuromas, megacolon, marfanoid body habitus .  I have seen him once before on 4/2024. We have been working on medication compliance and treatment of surgical hypothyroidism since then.  At our last appt he was on LT4 125 mcg/day . He has been on 150 mcg/day since 7/2024. TSH was still high on the 9/13/24 testing .  Neck US obtained 5/15/24 showed left thyroid bed abnormality.   This has not had FNAB.      The MEN related history is as follows:   The periodontist he was seeing  for gum hyperplasia also noted Mucosal neuromas leading to  ret testing 6/27/27 RET M918T ( I do not have the primary report)  He was soon discovered to have medullary thyroid carcinoma .  Preoperative Calcitonin i 1942 7/7/2017 total thyroidectomy , CND, bilateral level 2-4 neck dissection; right RLN anastomosis ([facility]) -  I do not have operative report MTC MF bilateral, + 42/77 LN with LYNNETTE  . The path report doesn't have primary tumor size measurement-- other notes describe ETE, grossly invading strap muscles, esophageal muscles, cricopharyngeus muscle, plastered and stuck to the tracheal cartilage and invading, encasing the RLN.  Some of the LNs were stuck to the internal jugular, deep spaces of the neck RLN and sympathetic chain.  soft tissues, + margins, + extensive angiolymphatic invasion, + perineural invasion  2 parathyroid glands resected  Post op low calcium and hypertension  7/3/17 MRI nodular thickening left adrenal   7/11/22 laparoscopic partial left adrenalectomy with  intraoperative US - ([facility]) --  3 tumor foci removed-- Path: left  pheochromocytoma  x 2  -- I don't have primary path report that fully describes the tumor    He is in an MEN2B study at [facility].     Today he notes he has had a video visit with [facility] since our last visit -     Endocrine relevant labs are as follows:  10/16/18 calcitonin 73, CEA 5.3  9/30/19 Calcitonin 96, CEA 5.6  8/5/2020 calcitonin 123, CEA 7.2, metanephrine 0.69 nM, normetanephrine 0.8  3/10/21 calcitonin 99, CEA 7.2, metanephrine 0.5 nM, normetanephrine 0.4  10/14/21 calcitonin 144, CEA 7.9 ng/ml, metanephrine 0.81 nM, normetanephrine 0.63 nM, TSH 0.25, free T4 1.24, PTH 11.4, Ca 9.6, YAIR < 1.8, Tg < 0.1  11/21/21 height 182 cm, weight 50.4 kg, BMI 15.2 kg/m2  3/28/23 calcitonin 130, renin activity 17, metanephrine 0.65 nM, normetanephrine 0.46, TSH 0.04, free T4 1.5, Tg < 0.1, YAIR < 1.8, PTH 12,2 Ca 9.9; .94 mg/g creatinine  8/7/23 [facility]   pg/ml, Epi  <25 pg/ml, DA < 25 pg/ml, cortisol 14.4, CGA 45 (< 0.93 ng/ml), metanephrine 0.47, normetanephrine  0.41  5/15/24 TSh 46.95, free T4 1.29, Ca 8,2 PTH 22, phos 3.7, CEA 8.4, calcitonin 70.6, metanephrine 0.42 nM, normetanephrine 0.47 nM-- admitted missed doses LT4   7/15/24 increase LT4 to 150 mcg/day  9/13/24 TSH 20.29, free T4 1.67, Ca 8.6, phos 3.4,     Relevant imaging is as follows: (as read by me as it pertains to endocrine relevant organs)  Selected studieses described   6/22/17 and 6/28/17 thyroid US ([facility]):   Right nodule 1.4 x ? X 2,2   Right sup posterior 0.9  X 0.7 x 1.2 cm   Left whole lobe appears involved  2  X   1.8 x 2.8 / abnormal; shadowing calcificaiton   Left isthmus 1.5 x   7/19/17 FDG PET CT [facility] -on pacs- negative to my eye; thyroid absent  4/28/22 US - not on pacs   4/24/23 MRI  not on pas   12/15/23 CT abdomen - not on pacs: left adrenal atrophic ; subtle thickening right adrenal medial limb (series 2, image 25)  8/8/23 Xray abdomen:   8/8/23 ([facility])  F-18-F-DOPA PET ([facility]) persistent small uptake right upper paratracheal with no progressoin since prior study  8/9/23 F18 F Doamine PET ([facility]) negative  8/9/23 CT  neck , CAP ([facility]): RLL lung nodule 0.2 cm, stable; LLL subpleural nodule 0.2 cm stable; stable left adrenal nodule ; subcm paratracheal nodules lower neck   8/10/23 dotatate PET CT ([facility]) no evidence of SSTR2 positive lesions, including at the left adrenal  8/11/2023 FDG  PET CT  ([facility]) -   5/15/24 neck US compared with 11/18/21 [facility]   Right level 4 # 1 0.7 x 0.3 x 0.8 cm   Right level 5 high 1 1.1 x 0.7 x 1.7 cm   Right level 5 lower # 3 1 x 0.6 x 1.3 cm   Left level 4 # 1 0.4 x 0.4 x 0.6   Left level 5 high # 1 1.4 x 0.4 x 1.2   Left level 6# 1 0.7 x 0.5 x 0.8 abnormal  was  ?  0.4 x 0.6   Left level 7 # 1 0.7 x 0.4 x  0.8  cm       REVIEW OF SYSTEMS    A little more energy  No longer has the grogginess   Sleep at night good  Weight gain - now up to 175# ;    Cardiac :had a scare -wasn't on medication / had run out - and the study doctor was retiring/  -- his BP was high without med 140/90s ; but one day it went up to 170/110 with associated headache.  He went to the clinic and BP was 180/130, 200/120.  They got him back on lisinopril.   Appt with nephrologist at [facility] -- he is working with him on the BP med  they increase   Respiratory: negative   GI: sometimes horrible stomach ache with watery BM, last occurred about one week ago; Now BM is still a little runny  EXT: some swelling  No temperature intolerance.     Past Medical History  Past Medical History:   Diagnosis Date     ADHD (attention deficit hyperactivity disorder)      Duane's syndrome, right eye      Hepatic hemangioma      HTN (hypertension)      Hypocalcemia      Lyme disease 2012     Medullary thyroid carcinoma (H) 07/07/2017     Megacolon      Multiple endocrine neoplasia type 2B (MEN2B) (H) 06/27/2017    sporadic mutation RET M918T     Myopathy 2010     Pes cavus       Pheochromocytoma of left adrenal gland 07/11/2022    2 foci     Postsurgical hypothyroidism 07/07/2017     Proximal humerus fracture 04/02/2016    fall from standing height     Renal artery anomaly     trifurcation right; bifurcation left     Scoliosis        Past Surgical History:   Procedure Laterality Date     BIOPSY       HERNIA REPAIR Right 2002     laparoscopic partial adrenalectomy Left 07/11/2022     ORCHIOPEXY Right 2002     ORTHOPEDIC SURGERY       subtalar joint and talonavicular joint fusion with multiple tendon transfers, MPJ capsulotomy, PIPJ capsulotomy, Steindler stripping, and gastrocnemius lengthening by FAS  06/05/2018     THYROIDECTOMY   07/07/2017       Medications  Current Outpatient Medications   Medication Sig Dispense Refill     amLODIPine (NORVASC) 10 MG tablet Take 1 tablet (10 mg) by mouth daily       levothyroxine (SYNTHROID/LEVOTHROID) 150 MCG tablet Take 1 tablet (150 mcg) by mouth daily. 90 tablet 4     He has a pill tray but? 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  BP Readings from Last 1 Encounters:   04/02/16 (!) 134/98 (97%, Z = 1.88 /  >99 %, Z >2.33)*     *BP percentiles are based on the 2017 AAP Clinical Practice Guideline for boys      Pulse Readings from Last 1 Encounters:   No data found for Pulse      Resp Readings from Last 1 Encounters:   04/02/16 18      Temp Readings  "from Last 1 Encounters:   04/02/16 97.6  F (36.4  C)      SpO2 Readings from Last 1 Encounters:   04/02/16 98%      Wt Readings from Last 1 Encounters:   04/02/16 40.6 kg (89 lb 8 oz) (3%, Z= -1.90)*     * Growth percentiles are based on CDC (Boys, 2-20 Years) data.      Ht Readings from Last 1 Encounters:   04/02/16 1.651 m (5' 5\") (30%, Z= -0.52)*     * Growth percentiles are based on CDC (Boys, 2-20 Years) data.     GENERAL: pleasant young man in no distress; full lips; I can see from upper chest up   SKIN: Visible skin clear. No significant rash, abnormal pigmentation or lesions.  EYES: no scleral icterus;   NECK: visible goiter is not present; no visible neck masses  RESP: No audible wheeze, cough, or  increased work of breathing.    NEURO: Awake, alert, responds appropriately to questions.  Mentation and speech fluent.  PSYCH:affect normal and appearance well-groomed.    DATA     EXAMINATION: US HEAD NECK SOFT TISSUE, 5/15/2024 11:30 AM   COMPARISON: PET/CT 8/11/2023  HISTORY: Medullary thyroid carcinoma (H); Postsurgical hypothyroidism;  Hypocalcemia; MEN 2B syndrome (H)  TECHNIQUE: Sonographic imaging performed of the neck to evaluate for lymph nodes using grey scale and limited Doppler technique.  FINDINGS:  Lymph nodes are measured bilaterally with measurements given in transverse, AP, and craniocaudal dimensions as follows:  Right:  Level 2: Ovoid lymph node with diminutive fatty hilum and no internal vascularity on color Doppler measuring 0.9 x 0.7 x 1 cm, possibly benign.  Level 3: No suspicious or enlarged lymph node  Level 4: Ovoid lymph node with fatty hilum and no internal vascularity on Doppler measuring 0.7 x 0.3 x 0.8 cm, possibly benign  Level 5:   Lymph node #1: Oval-shaped lymph node with fatty hilum and no internal vascularity on color Doppler measuring 1.1 x 0.7 x 1.7 cm, probably benign.  Lymph node#3: Ovoid lymph node with fatty hilum and mild hilar vascularity on color Doppler measuring 1 " x 0.6 x 1.4 cm, possibly benign   Level 6: No suspicious or enlarged lymph node  Level 7: No suspicious or enlarged lymph node   Left:  Level 2: No suspicious or enlarged lymph node  Level 3: No suspicious or enlarged lymph node  Level 4: Ovoid lymph node measuring 0.4 x 0.4 x 0.6 cm with indistinct  fatty hilum and no internal vascularity, indeterminate/possibly benign.  Level 5: Oval-shaped lymph node with fatty hilum and hilar vascularity on color Doppler measuring 1.4 x 0.4 x 1.2 cm, possibly benign  Level 6 thyroid bed: Mildly lobulated hypoechoic echogenicity with indistinct fatty hilum and internal vascularity measuring 0.7 x 0.5 x 0.8 cm, indeterminate  Level7: Oval-shaped lymph node with no internal vascularity on Doppler and questionable echogenic foci, indeterminate, measuring 0.7 x 0.4 x 0.8 cm.                                                         IMPRESSION:  Soft tissue neck ultrasound with lymph node measurements as described above, including indeterminate left level 6 hypoechogenicity.  I have personally reviewed the examination and initial interpretation and I agree with the findings.  BREE LACEY MD      Latest Reference Range & Units 05/15/24 11:39 09/13/24 11:10   Sodium 135 - 145 mmol/L 139    Potassium 3.4 - 5.3 mmol/L 3.4    Calcium 8.8 - 10.4 mg/dL 8.2 (L) 8.6 (L)   Phosphorus 2.5 - 4.5 mg/dL 3.7 3.4   Calcitonin 0.0 - 7.5 pg/mL 70.6 (H) 106.0 (H)   CEA ng/mL 8.4 8.6   METANEPHRINES PLASMA FREE  Rpt    Renin Activity ng/mL/hr 22.0    T4 Free 0.90 - 1.70 ng/dL 1.29 1.67   TSH 0.30 - 4.20 uIU/mL 46.95 (H) 20.29 (H)      Latest Reference Range & Units 05/15/24 11:39   Parathyroid Hormone Intact 15 - 65 pg/mL 22      Latest Reference Range & Units 05/15/24 11:39   Chromogranin A 0 - 187 ng/mL 42         Again, thank you for allowing me to participate in the care of your patient.      Sincerely,    Denise Lara MD

## 2024-10-28 NOTE — PROGRESS NOTES
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  Treat to normal target TSH. Not at target  Unstable.  Compliance has been an issue.    Labs in November 2024 with dose adjustment    Medullary thyroid carcinoma bilateral, multifocal, grossly invading ETE; + 43/76 LN with LYNNETTE  I don't have the original [facility] path report, only the re-read from [facility]  pT4a, pN1b, cM0, stage BETHANY    Left level 6 mass  as above  Repeat US now-     History of pheochromocytoma and partial adrenal surgery. .   HTN is being treated with lisinopril   He is at risk for pheo in remaining adrenal     History of partial left adrenalectomy -      The Longitudinal plan of care for postsurgical hypothyroidism related to MEN2b/ thyroid cancer/thyroid cancer surveillance/treatment/ was addressed during this visit. Due to added complexity of care, we will continue to support Trever , and the subsequent management of this condition(s) and with the ongoing continuity of care of this condition.    Due to the continued risks of COVID 19 transmission and to improve ease of access this visit was a telephone/video visit. The patient gave verbal consent for the visit today.    I have independently reviewed and interpreted labs, imaging as indicated.     Distant Location (provider location):  Off-site  Mode of Communication:  Video Conference via Thucy  Chart review/prep time 1  [id number]  Visit Start time  1549   Visit Stop time  1603   39__ minutes spent on the date of the encounter doing chart review, history and exam, documentation and further activities as noted above.      Denise Lara MD    Chief complaint:  HISTORY OF PRESENT ILLNESS    Trever presents for RET M918T MEN 2B, sporadic by history with associated MTC,  pheochromocytoma, mucosal neuromas, megacolon, marfanoid body habitus .  I have seen him once before on 4/2024. We have been working on medication compliance and treatment of surgical hypothyroidism since then.  At our last appt he was on LT4 125 mcg/day . He has been on 150 mcg/day since 7/2024. TSH was still high on the 9/13/24 testing .  Neck US obtained 5/15/24 showed left thyroid bed abnormality.   This has not had FNAB.      The MEN related history is as follows:   The periodontist he was seeing  for gum hyperplasia also noted Mucosal neuromas leading to  ret testing 6/27/27 RET M918T ( I do not have the primary report)  He was soon discovered to have medullary thyroid carcinoma .  Preoperative Calcitonin i 1941 7/7/2017 total thyroidectomy , CND, bilateral level 2-4 neck dissection; right RLN anastomosis ([facility]) -  I do not have operative report MTC MF bilateral, + 42/77 LN with LYNNETTE  . The path report doesn't have primary tumor size measurement-- other notes describe ETE, grossly invading strap muscles, esophageal muscles, cricopharyngeus muscle, plastered and stuck to the tracheal cartilage and invading, encasing the RLN.  Some of the LNs were stuck to the internal jugular, deep spaces of the neck RLN and sympathetic chain.  soft tissues, + margins, + extensive angiolymphatic invasion, + perineural invasion  2 parathyroid glands resected  Post op low calcium and hypertension  7/3/17 MRI nodular thickening left adrenal   7/11/22 laparoscopic partial left adrenalectomy with intraoperative US - ([facility]) --  3 tumor foci removed-- Path: left  pheochromocytoma  x 2  -- I don't have primary path report that fully describes the tumor    He is in an MEN2B study at [facility].     Today he notes he has had a video visit with [facility] since our last visit -     Endocrine relevant labs are as follows:  10/16/18 calcitonin 73, CEA 5.3  9/30/19 Calcitonin 96, CEA 5.6  8/5/2020 calcitonin 123, CEA 7.2, metanephrine 0.69 nM,  normetanephrine 0.8  3/10/21 calcitonin 99, CEA 7.2, metanephrine 0.5 nM, normetanephrine 0.4  10/14/21 calcitonin 144, CEA 7.9 ng/ml, metanephrine 0.81 nM, normetanephrine 0.63 nM, TSH 0.25, free T4 1.24, PTH 11.4, Ca 9.6, YAIR < 1.8, Tg < 0.1  11/21/21 height 182 cm, weight 50.4 kg, BMI 15.2 kg/m2  3/28/23 calcitonin 130, renin activity 17, metanephrine 0.65 nM, normetanephrine 0.46, TSH 0.04, free T4 1.5, Tg < 0.1, YAIR < 1.8, PTH 12,2 Ca 9.9; .94 mg/g creatinine  8/7/23 NIH   pg/ml, Epi  <25 pg/ml, DA < 25 pg/ml, cortisol 14.4, CGA 45 (< 0.93 ng/ml), metanephrine 0.47, normetanephrine  0.41  5/15/24 TSh 46.95, free T4 1.29, Ca 8,2 PTH 22, phos 3.7, CEA 8.4, calcitonin 70.6, metanephrine 0.42 nM, normetanephrine 0.47 nM-- admitted missed doses LT4   7/15/24 increase LT4 to 150 mcg/day  9/13/24 TSH 20.29, free T4 1.67, Ca 8.6, phos 3.4,     Relevant imaging is as follows: (as read by me as it pertains to endocrine relevant organs)  Selected studieses described   6/22/17 and 6/28/17 thyroid US ([facility]):   Right nodule 1.4 x ? X 2,2   Right sup posterior 0.9  X 0.7 x 1.2 cm   Left whole lobe appears involved  2  X   1.8 x 2.8 / abnormal; shadowing calcificaiton   Left isthmus 1.5 x   7/19/17 FDG PET CT [facility] -on pacs- negative to my eye; thyroid absent  4/28/22 US - not on pacs   4/24/23 MRI  not on pas   12/15/23 CT abdomen - not on pacs: left adrenal atrophic ; subtle thickening right adrenal medial limb (series 2, image 25)  8/8/23 Xray abdomen:   8/8/23 ([facility]) F-18-F-DOPA PET ([facility]) persistent small uptake right upper paratracheal with no progressoin since prior study  8/9/23 F18 F Doamine PET ([facility]) negative  8/9/23 CT  neck , CAP ([facility]): RLL lung nodule 0.2 cm, stable; LLL subpleural nodule 0.2 cm stable; stable left adrenal nodule ; subcm paratracheal nodules lower neck   8/10/23 dotatate PET CT ([facility]) no evidence of SSTR2 positive lesions, including at the left adrenal  8/11/2023 FDG  PET CT   ([facility]) -   5/15/24 neck US compared with 11/18/21 [facility]   Right level 4 # 1 0.7 x 0.3 x 0.8 cm   Right level 5 high 1 1.1 x 0.7 x 1.7 cm   Right level 5 lower # 3 1 x 0.6 x 1.3 cm   Left level 4 # 1 0.4 x 0.4 x 0.6   Left level 5 high # 1 1.4 x 0.4 x 1.2   Left level 6# 1 0.7 x 0.5 x 0.8 abnormal  was  ?  0.4 x 0.6   Left level 7 # 1 0.7 x 0.4 x  0.8  cm       REVIEW OF SYSTEMS    A little more energy  No longer has the grogginess   Sleep at night good  Weight gain - now up to 175# ;    Cardiac :had a scare -wasn't on medication / had run out - and the study doctor was retiring/  -- his BP was high without med 140/90s ; but one day it went up to 170/110 with associated headache.  He went to the clinic and BP was 180/130, 200/120.  They got him back on lisinopril.   Appt with nephrologist at [facility] -- he is working with him on the BP med  they increase   Respiratory: negative   GI: sometimes horrible stomach ache with watery BM, last occurred about one week ago; Now BM is still a little runny  EXT: some swelling  No temperature intolerance.     Past Medical History  Past Medical History:   Diagnosis Date    ADHD (attention deficit hyperactivity disorder)     Duane's syndrome, right eye     Hepatic hemangioma     HTN (hypertension)     Hypocalcemia     Lyme disease 2012    Medullary thyroid carcinoma (H) 07/07/2017    Megacolon     Multiple endocrine neoplasia type 2B (MEN2B) (H) 06/27/2017    sporadic mutation RET M918T    Myopathy 2010    Pes cavus     Pheochromocytoma of left adrenal gland 07/11/2022    2 foci    Postsurgical hypothyroidism 07/07/2017    Proximal humerus fracture 04/02/2016    fall from standing height    Renal artery anomaly     trifurcation right; bifurcation left    Scoliosis        Past Surgical History:   Procedure Laterality Date    BIOPSY      HERNIA REPAIR Right 2002    laparoscopic partial adrenalectomy Left 07/11/2022    ORCHIOPEXY Right 2002    ORTHOPEDIC SURGERY      subtalar  "joint and talonavicular joint fusion with multiple tendon transfers, MPJ capsulotomy, PIPJ capsulotomy, Steindler stripping, and gastrocnemius lengthening by FAS  06/05/2018    THYROIDECTOMY   07/07/2017       Medications  Current Outpatient Medications   Medication Sig Dispense Refill    amLODIPine (NORVASC) 10 MG tablet Take 1 tablet (10 mg) by mouth daily      levothyroxine (SYNTHROID/LEVOTHROID) 150 MCG tablet Take 1 tablet (150 mcg) by mouth daily. 90 tablet 4     He has a pill tray but? 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  BP Readings from Last 1 Encounters:   04/02/16 (!) 134/98 (97%, Z = 1.88 /  >99 %, Z >2.33)*     *BP percentiles are based on the 2017 AAP Clinical Practice Guideline for boys      Pulse Readings from Last 1 Encounters:   No data found for Pulse      Resp Readings from Last 1 Encounters:   04/02/16 18      Temp Readings from Last 1 Encounters:   04/02/16 97.6  F (36.4  C)      SpO2 Readings from Last 1 Encounters:   04/02/16 98%      Wt Readings from Last 1 Encounters:   04/02/16 40.6 kg (89 lb 8 oz) (3%, Z= -1.90)*     * Growth percentiles are based on CDC (Boys, 2-20 Years) data.      Ht Readings from Last 1 Encounters:   04/02/16 1.651 m (5' 5\") (30%, Z= -0.52)*     * Growth percentiles are based on CDC (Boys, 2-20 Years) data.     GENERAL: pleasant young man in no distress; full lips; I can " see from upper chest up   SKIN: Visible skin clear. No significant rash, abnormal pigmentation or lesions.  EYES: no scleral icterus;   NECK: visible goiter is not present; no visible neck masses  RESP: No audible wheeze, cough, or  increased work of breathing.    NEURO: Awake, alert, responds appropriately to questions.  Mentation and speech fluent.  PSYCH:affect normal and appearance well-groomed.    DATA     EXAMINATION: US HEAD NECK SOFT TISSUE, 5/15/2024 11:30 AM   COMPARISON: PET/CT 8/11/2023  HISTORY: Medullary thyroid carcinoma (H); Postsurgical hypothyroidism;  Hypocalcemia; MEN 2B syndrome (H)  TECHNIQUE: Sonographic imaging performed of the neck to evaluate for lymph nodes using grey scale and limited Doppler technique.  FINDINGS:  Lymph nodes are measured bilaterally with measurements given in transverse, AP, and craniocaudal dimensions as follows:  Right:  Level 2: Ovoid lymph node with diminutive fatty hilum and no internal vascularity on color Doppler measuring 0.9 x 0.7 x 1 cm, possibly benign.  Level 3: No suspicious or enlarged lymph node  Level 4: Ovoid lymph node with fatty hilum and no internal vascularity on Doppler measuring 0.7 x 0.3 x 0.8 cm, possibly benign  Level 5:   Lymph node #1: Oval-shaped lymph node with fatty hilum and no internal vascularity on color Doppler measuring 1.1 x 0.7 x 1.7 cm, probably benign.  Lymph node#3: Ovoid lymph node with fatty hilum and mild hilar vascularity on color Doppler measuring 1 x 0.6 x 1.4 cm, possibly benign   Level 6: No suspicious or enlarged lymph node  Level 7: No suspicious or enlarged lymph node   Left:  Level 2: No suspicious or enlarged lymph node  Level 3: No suspicious or enlarged lymph node  Level 4: Ovoid lymph node measuring 0.4 x 0.4 x 0.6 cm with indistinct  fatty hilum and no internal vascularity, indeterminate/possibly benign.  Level 5: Oval-shaped lymph node with fatty hilum and hilar vascularity on color Doppler measuring 1.4 x 0.4  x 1.2 cm, possibly benign  Level 6 thyroid bed: Mildly lobulated hypoechoic echogenicity with indistinct fatty hilum and internal vascularity measuring 0.7 x 0.5 x 0.8 cm, indeterminate  Level7: Oval-shaped lymph node with no internal vascularity on Doppler and questionable echogenic foci, indeterminate, measuring 0.7 x 0.4 x 0.8 cm.                                                         IMPRESSION:  Soft tissue neck ultrasound with lymph node measurements as described above, including indeterminate left level 6 hypoechogenicity.  I have personally reviewed the examination and initial interpretation and I agree with the findings.  BREE LACEY MD      Latest Reference Range & Units 05/15/24 11:39 09/13/24 11:10   Sodium 135 - 145 mmol/L 139    Potassium 3.4 - 5.3 mmol/L 3.4    Calcium 8.8 - 10.4 mg/dL 8.2 (L) 8.6 (L)   Phosphorus 2.5 - 4.5 mg/dL 3.7 3.4   Calcitonin 0.0 - 7.5 pg/mL 70.6 (H) 106.0 (H)   CEA ng/mL 8.4 8.6   METANEPHRINES PLASMA FREE  Rpt    Renin Activity ng/mL/hr 22.0    T4 Free 0.90 - 1.70 ng/dL 1.29 1.67   TSH 0.30 - 4.20 uIU/mL 46.95 (H) 20.29 (H)      Latest Reference Range & Units 05/15/24 11:39   Parathyroid Hormone Intact 15 - 65 pg/mL 22      Latest Reference Range & Units 05/15/24 11:39   Chromogranin A 0 - 187 ng/mL 42

## 2024-10-28 NOTE — NURSING NOTE
Current patient location: [address] [place] [address] N  [facility] [address]    Is the patient currently in the state of [place]? YES    Visit mode:VIDEO    If the visit is dropped, the patient can be reconnected by: VIDEO VISIT: Text to cell phone:   Telephone Information:   Mobile [phone]       Will anyone else be joining the visit? NO  (If patient encounters technical issues they should call [phone]    Are changes needed to the allergy or medication list? Yes pt requested removal of amlodipine     Are refills needed on medications prescribed by this physician? NO    Rooming Documentation:  Not applicable    Reason for visit: RECHECK (6 mo follow-up )    Keyonna TAMAYO

## 2024-10-28 NOTE — PATIENT INSTRUCTIONS
Labs every 6 months starting in November 2024  24 hour urine catechols and metanephrine -- the laboratory will provide supplies for this test      Repeat neck US  now at the [facility] ([phone] to schedule)

## 2024-12-03 ENCOUNTER — TELEPHONE (OUTPATIENT)
Dept: ENDOCRINOLOGY | Facility: CLINIC | Age: 24
End: 2024-12-03
Payer: COMMERCIAL

## 2024-12-03 NOTE — TELEPHONE ENCOUNTER
Left Voicemail (1st Attempt) and Sent Mychart (1st Attempt) for the patient to call back and schedule the following:    Appointment type: return thyroid cancer  Provider: Jane  Return date: April 2025  Specialty phone number: [phone]  Additional appointment(s) needed: Labs and US at next convenience  Additonal Notes:   LVM, MyC x1     Check Out Comments:   Consent and Open care everywhere for [facility]   Labs in November 2024   Neck US now at [facility] OK to use return XIOMARA   Disposition:   Return in about 6 months (around 4/28/2025).     Slots open at time of TE:  04/22 4:20/4:40 virtual  04/28 4:20 virtual  04/29 4:00 virtual  05/05 4:20/4:40 virtual    Please note that the above appointment(s) will require manual scheduling as they are marked as XIOMARA and will not appear using auto search. Do not schedule the patient if another patient has already been scheduled in the requested appointment slot.     Prerna Corrae on 12/3/2024 at 10:43 AM    
total score10/28. Based on this exam, pt is at high risk to falls.

## 2024-12-12 ENCOUNTER — MYC REFILL (OUTPATIENT)
Dept: ENDOCRINOLOGY | Facility: CLINIC | Age: 24
End: 2024-12-12
Payer: COMMERCIAL

## 2024-12-12 DIAGNOSIS — C73 MEDULLARY THYROID CARCINOMA (H): ICD-10-CM

## 2024-12-12 DIAGNOSIS — E89.0 POSTSURGICAL HYPOTHYROIDISM: ICD-10-CM

## 2024-12-12 RX ORDER — LEVOTHYROXINE SODIUM 150 UG/1
150 TABLET ORAL DAILY
Qty: 90 TABLET | Refills: 4 | Status: CANCELLED | OUTPATIENT
Start: 2024-12-12